# Patient Record
Sex: FEMALE | Race: WHITE | NOT HISPANIC OR LATINO
[De-identification: names, ages, dates, MRNs, and addresses within clinical notes are randomized per-mention and may not be internally consistent; named-entity substitution may affect disease eponyms.]

---

## 2017-03-03 ENCOUNTER — CHARTING TRANS (OUTPATIENT)
Dept: OTHER | Age: 57
End: 2017-03-03

## 2018-11-05 VITALS
HEART RATE: 91 BPM | DIASTOLIC BLOOD PRESSURE: 70 MMHG | TEMPERATURE: 98.6 F | WEIGHT: 192 LBS | RESPIRATION RATE: 20 BRPM | SYSTOLIC BLOOD PRESSURE: 120 MMHG | BODY MASS INDEX: 30.13 KG/M2 | HEIGHT: 67 IN

## 2021-06-18 ENCOUNTER — EXTERNAL LAB (OUTPATIENT)
Dept: OTHER | Age: 61
End: 2021-06-18

## 2021-06-18 LAB — LAB RESULT: NORMAL

## 2021-08-20 ENCOUNTER — EXTERNAL LAB (OUTPATIENT)
Dept: OTHER | Age: 61
End: 2021-08-20

## 2021-08-20 LAB — LAB RESULT: NORMAL

## 2021-12-30 ENCOUNTER — HOSPITAL ENCOUNTER (OUTPATIENT)
Age: 61
Discharge: HOME | End: 2021-12-30
Payer: COMMERCIAL

## 2021-12-30 VITALS
RESPIRATION RATE: 18 BRPM | TEMPERATURE: 97 F | HEART RATE: 100 BPM | OXYGEN SATURATION: 98 % | SYSTOLIC BLOOD PRESSURE: 125 MMHG | DIASTOLIC BLOOD PRESSURE: 64 MMHG

## 2021-12-30 DIAGNOSIS — H66.92 LEFT ACUTE OTITIS MEDIA: Primary | ICD-10-CM

## 2021-12-30 PROCEDURE — 99203 OFFICE O/P NEW LOW 30 MIN: CPT | Performed by: PHYSICIAN ASSISTANT

## 2021-12-30 RX ORDER — DOXYCYCLINE HYCLATE 100 MG/1
100 CAPSULE ORAL 2 TIMES DAILY
Qty: 14 CAPSULE | Refills: 0 | Status: SHIPPED | OUTPATIENT
Start: 2021-12-30 | End: 2022-01-06

## 2021-12-30 NOTE — ED INITIAL ASSESSMENT (HPI)
Pt here w c/o L earache x 3 days, states having stabbing pain. +runny nose from seasonal allergies per pt. Denies any fevers, no drainage.

## 2021-12-30 NOTE — ED PROVIDER NOTES
Patient Seen in: [facility]    History   Patient presents with:  Ear Problem Pain    Stated Complaint: [phone] left ear pain    HPI    65-year-old female presents with chief complaint of left earache. Onset 3 days ago. Pain scale 4/10. Conjunctiva are within normal limits. ENT: Pharynx noninjected. Tonsils within normal size limits bilaterally. No tonsillar exudates. Left TM injected. Opaque fluid present proximally to intact left TM. Right TM within normal limits.   Auditory canals Impression:  Left acute otitis media  (primary encounter diagnosis)    Disposition:  Discharge    Follow-up:  Aide Tillman MD  [address] [facility]  [id number]  [place] [address]  [phone]    Schedule an appointment as soon as possible for a visit in 2 days

## 2022-11-01 ENCOUNTER — EXTERNAL LAB (OUTPATIENT)
Dept: OTHER | Age: 62
End: 2022-11-01

## 2022-11-01 LAB — LAB RESULT: NORMAL

## 2022-12-16 ENCOUNTER — OFFICE VISIT (OUTPATIENT)
Dept: SURGERY | Age: 62
End: 2022-12-16

## 2022-12-16 VITALS
DIASTOLIC BLOOD PRESSURE: 86 MMHG | BODY MASS INDEX: 28.72 KG/M2 | WEIGHT: 183 LBS | SYSTOLIC BLOOD PRESSURE: 150 MMHG | HEIGHT: 67 IN | OXYGEN SATURATION: 98 % | HEART RATE: 90 BPM | TEMPERATURE: 97.5 F

## 2022-12-16 DIAGNOSIS — C44.90 PRIMARY ADNEXAL CARCINOMA OF SKIN: Primary | ICD-10-CM

## 2022-12-16 PROCEDURE — 99203 OFFICE O/P NEW LOW 30 MIN: CPT | Performed by: PLASTIC SURGERY

## 2023-01-31 ASSESSMENT — ACTIVITIES OF DAILY LIVING (ADL)
HISTORY OF FALLING IN THE LAST YEAR (PRIOR TO ADMISSION): NO
SENSORY_SUPPORT_DEVICES: CONTACTS
ADL_BEFORE_ADMISSION: INDEPENDENT
ADL_SCORE: 12

## 2023-02-01 ENCOUNTER — HOSPITAL ENCOUNTER (OUTPATIENT)
Age: 63
Discharge: HOME | End: 2023-02-01
Attending: PLASTIC SURGERY | Admitting: PLASTIC SURGERY

## 2023-02-01 VITALS
TEMPERATURE: 97.5 F | BODY MASS INDEX: 28.06 KG/M2 | WEIGHT: 178.79 LBS | SYSTOLIC BLOOD PRESSURE: 131 MMHG | DIASTOLIC BLOOD PRESSURE: 67 MMHG | HEIGHT: 67 IN | RESPIRATION RATE: 18 BRPM | HEART RATE: 81 BPM | OXYGEN SATURATION: 97 %

## 2023-02-01 DIAGNOSIS — C44.90 PRIMARY ADNEXAL CARCINOMA OF SKIN: ICD-10-CM

## 2023-02-01 PROCEDURE — 15271 SKIN SUB GRAFT TRNK/ARM/LEG: CPT | Performed by: PLASTIC SURGERY

## 2023-02-01 PROCEDURE — 11622 EXC S/N/H/F/G MAL+MRG 1.1-2: CPT | Performed by: PLASTIC SURGERY

## 2023-02-01 PROCEDURE — C1762 CONN TISS, HUMAN(INC FASCIA): HCPCS | Performed by: PLASTIC SURGERY

## 2023-02-01 PROCEDURE — 10006023 HB SUPPLY 272: Performed by: PLASTIC SURGERY

## 2023-02-01 PROCEDURE — 10004651 HB RX, NO CHARGE ITEM: Performed by: PLASTIC SURGERY

## 2023-02-01 PROCEDURE — 10006027 HB SUPPLY 278: Performed by: PLASTIC SURGERY

## 2023-02-01 PROCEDURE — 13000056 HB LOCAL 3 CASE CHARGE: Performed by: PLASTIC SURGERY

## 2023-02-01 PROCEDURE — 88305 TISSUE EXAM BY PATHOLOGIST: CPT | Performed by: PLASTIC SURGERY

## 2023-02-01 PROCEDURE — 10002801 HB RX 250 W/O HCPCS: Performed by: PLASTIC SURGERY

## 2023-02-01 DEVICE — IMPLANTABLE DEVICE: Type: IMPLANTABLE DEVICE | Site: TOE | Status: FUNCTIONAL

## 2023-02-01 RX ORDER — HYDROCODONE BITARTRATE AND ACETAMINOPHEN 5; 325 MG/1; MG/1
1 TABLET ORAL EVERY 6 HOURS PRN
Qty: 5 TABLET | Refills: 0 | Status: SHIPPED | OUTPATIENT
Start: 2023-02-01 | End: 2023-02-07 | Stop reason: CLARIF

## 2023-02-01 RX ORDER — LIDOCAINE HYDROCHLORIDE AND EPINEPHRINE 10; 10 MG/ML; UG/ML
INJECTION, SOLUTION INFILTRATION; PERINEURAL PRN
Status: DISCONTINUED | OUTPATIENT
Start: 2023-02-01 | End: 2023-02-01 | Stop reason: HOSPADM

## 2023-02-01 RX ORDER — ACETAMINOPHEN 500 MG
1000 TABLET ORAL
Status: COMPLETED | OUTPATIENT
Start: 2023-02-01 | End: 2023-02-01

## 2023-02-01 RX ORDER — MAGNESIUM HYDROXIDE 1200 MG/15ML
LIQUID ORAL PRN
Status: DISCONTINUED | OUTPATIENT
Start: 2023-02-01 | End: 2023-02-01 | Stop reason: HOSPADM

## 2023-02-01 RX ORDER — LIDOCAINE HYDROCHLORIDE 10 MG/ML
INJECTION, SOLUTION INFILTRATION; PERINEURAL PRN
Status: DISCONTINUED | OUTPATIENT
Start: 2023-02-01 | End: 2023-02-01 | Stop reason: HOSPADM

## 2023-02-01 RX ADMIN — ACETAMINOPHEN 1000 MG: 500 TABLET ORAL at 17:23

## 2023-02-01 ASSESSMENT — PAIN SCALES - GENERAL
PAINLEVEL_OUTOF10: 0

## 2023-02-07 ENCOUNTER — OFFICE VISIT (OUTPATIENT)
Dept: SURGERY | Age: 63
End: 2023-02-07

## 2023-02-07 VITALS
DIASTOLIC BLOOD PRESSURE: 84 MMHG | SYSTOLIC BLOOD PRESSURE: 142 MMHG | TEMPERATURE: 96.8 F | OXYGEN SATURATION: 100 % | HEIGHT: 67 IN | WEIGHT: 185 LBS | HEART RATE: 83 BPM | BODY MASS INDEX: 29.03 KG/M2

## 2023-02-07 DIAGNOSIS — C44.90 PRIMARY ADNEXAL CARCINOMA OF SKIN: Primary | ICD-10-CM

## 2023-02-07 PROCEDURE — 99024 POSTOP FOLLOW-UP VISIT: CPT | Performed by: PLASTIC SURGERY

## 2023-02-09 LAB
ASR DISCLAIMER: NORMAL
CASE RPRT: NORMAL
CLINICAL INFO: NORMAL
PATH REPORT.FINAL DX SPEC: NORMAL
PATH REPORT.GROSS SPEC: NORMAL

## 2023-02-27 ENCOUNTER — PREP FOR CASE (OUTPATIENT)
Dept: SURGERY | Age: 63
End: 2023-02-27

## 2023-02-27 ENCOUNTER — OFFICE VISIT (OUTPATIENT)
Dept: SURGERY | Age: 63
End: 2023-02-27

## 2023-02-27 VITALS
OXYGEN SATURATION: 97 % | DIASTOLIC BLOOD PRESSURE: 88 MMHG | BODY MASS INDEX: 29.35 KG/M2 | HEIGHT: 67 IN | SYSTOLIC BLOOD PRESSURE: 138 MMHG | WEIGHT: 187 LBS | TEMPERATURE: 97.6 F | HEART RATE: 85 BPM

## 2023-02-27 DIAGNOSIS — C44.90 PRIMARY ADNEXAL CARCINOMA OF SKIN: Primary | ICD-10-CM

## 2023-02-27 PROCEDURE — 99024 POSTOP FOLLOW-UP VISIT: CPT | Performed by: PLASTIC SURGERY

## 2023-03-10 RX ORDER — ACETAMINOPHEN 500 MG
1000 TABLET ORAL
Status: CANCELLED | OUTPATIENT
Start: 2023-03-10

## 2023-03-20 ENCOUNTER — OFFICE VISIT (OUTPATIENT)
Dept: SURGERY | Age: 63
End: 2023-03-20

## 2023-03-20 VITALS
SYSTOLIC BLOOD PRESSURE: 124 MMHG | WEIGHT: 189.6 LBS | OXYGEN SATURATION: 96 % | HEART RATE: 96 BPM | DIASTOLIC BLOOD PRESSURE: 85 MMHG | HEIGHT: 67 IN | BODY MASS INDEX: 29.76 KG/M2 | TEMPERATURE: 97.4 F

## 2023-03-20 DIAGNOSIS — C44.90 PRIMARY ADNEXAL CARCINOMA OF SKIN: Primary | ICD-10-CM

## 2023-03-20 PROCEDURE — 99024 POSTOP FOLLOW-UP VISIT: CPT | Performed by: PLASTIC SURGERY

## 2023-05-04 ENCOUNTER — HOSPITAL ENCOUNTER (OUTPATIENT)
Age: 63
Discharge: HOME | End: 2023-05-04
Attending: PLASTIC SURGERY | Admitting: PLASTIC SURGERY

## 2023-05-04 VITALS
DIASTOLIC BLOOD PRESSURE: 76 MMHG | BODY MASS INDEX: 29.03 KG/M2 | TEMPERATURE: 97.2 F | WEIGHT: 184.97 LBS | OXYGEN SATURATION: 97 % | RESPIRATION RATE: 16 BRPM | HEART RATE: 96 BPM | SYSTOLIC BLOOD PRESSURE: 131 MMHG | HEIGHT: 67 IN

## 2023-05-04 DIAGNOSIS — C44.90 PRIMARY ADNEXAL CARCINOMA OF SKIN: Primary | ICD-10-CM

## 2023-05-04 PROCEDURE — C1762 CONN TISS, HUMAN(INC FASCIA): HCPCS | Performed by: PLASTIC SURGERY

## 2023-05-04 PROCEDURE — 10004651 HB RX, NO CHARGE ITEM: Performed by: PLASTIC SURGERY

## 2023-05-04 PROCEDURE — 15275 SKIN SUB GRAFT FACE/NK/HF/G: CPT | Performed by: PLASTIC SURGERY

## 2023-05-04 PROCEDURE — 11622 EXC S/N/H/F/G MAL+MRG 1.1-2: CPT | Performed by: PLASTIC SURGERY

## 2023-05-04 PROCEDURE — 88305 TISSUE EXAM BY PATHOLOGIST: CPT | Performed by: PLASTIC SURGERY

## 2023-05-04 PROCEDURE — 10002801 HB RX 250 W/O HCPCS

## 2023-05-04 PROCEDURE — 10002801 HB RX 250 W/O HCPCS: Performed by: PLASTIC SURGERY

## 2023-05-04 PROCEDURE — 13000056 HB LOCAL 3 CASE CHARGE: Performed by: PLASTIC SURGERY

## 2023-05-04 PROCEDURE — 10006027 HB SUPPLY 278: Performed by: PLASTIC SURGERY

## 2023-05-04 DEVICE — GRAFT AXIOFILL PARTICULATE 1000MG 100CM SQ ACELLULAR ECM: Type: IMPLANTABLE DEVICE | Site: TOE | Status: FUNCTIONAL

## 2023-05-04 RX ORDER — DOCUSATE SODIUM 100 MG/1
100 CAPSULE, LIQUID FILLED ORAL 2 TIMES DAILY
Qty: 10 CAPSULE | Refills: 0 | Status: SHIPPED | OUTPATIENT
Start: 2023-05-04 | End: 2023-05-11

## 2023-05-04 RX ORDER — ONDANSETRON 2 MG/ML
4 INJECTION INTRAMUSCULAR; INTRAVENOUS EVERY 12 HOURS PRN
Status: DISCONTINUED | OUTPATIENT
Start: 2023-05-04 | End: 2023-05-04 | Stop reason: HOSPADM

## 2023-05-04 RX ORDER — ACETAMINOPHEN 500 MG
1000 TABLET ORAL
Status: COMPLETED | OUTPATIENT
Start: 2023-05-04 | End: 2023-05-04

## 2023-05-04 RX ORDER — HYDROCODONE BITARTRATE AND ACETAMINOPHEN 5; 325 MG/1; MG/1
2 TABLET ORAL EVERY 4 HOURS PRN
Status: DISCONTINUED | OUTPATIENT
Start: 2023-05-04 | End: 2023-05-04 | Stop reason: HOSPADM

## 2023-05-04 RX ORDER — SODIUM CHLORIDE, SODIUM LACTATE, POTASSIUM CHLORIDE, CALCIUM CHLORIDE 600; 310; 30; 20 MG/100ML; MG/100ML; MG/100ML; MG/100ML
INJECTION, SOLUTION INTRAVENOUS CONTINUOUS
Status: DISCONTINUED | OUTPATIENT
Start: 2023-05-04 | End: 2023-05-04 | Stop reason: HOSPADM

## 2023-05-04 RX ORDER — HYDROCODONE BITARTRATE AND ACETAMINOPHEN 5; 325 MG/1; MG/1
1 TABLET ORAL EVERY 4 HOURS PRN
Status: DISCONTINUED | OUTPATIENT
Start: 2023-05-04 | End: 2023-05-04 | Stop reason: HOSPADM

## 2023-05-04 RX ORDER — ONDANSETRON 4 MG/1
4 TABLET, ORALLY DISINTEGRATING ORAL EVERY 12 HOURS PRN
Status: DISCONTINUED | OUTPATIENT
Start: 2023-05-04 | End: 2023-05-04 | Stop reason: HOSPADM

## 2023-05-04 RX ORDER — MAGNESIUM HYDROXIDE 1200 MG/15ML
LIQUID ORAL PRN
Status: DISCONTINUED | OUTPATIENT
Start: 2023-05-04 | End: 2023-05-04 | Stop reason: HOSPADM

## 2023-05-04 RX ORDER — HYDROCODONE BITARTRATE AND ACETAMINOPHEN 5; 325 MG/1; MG/1
1 TABLET ORAL EVERY 6 HOURS PRN
Qty: 10 TABLET | Refills: 0 | Status: SHIPPED | OUTPATIENT
Start: 2023-05-04 | End: 2023-05-11

## 2023-05-04 RX ORDER — ACETAMINOPHEN 500 MG
500 TABLET ORAL EVERY 4 HOURS PRN
Status: DISCONTINUED | OUTPATIENT
Start: 2023-05-04 | End: 2023-05-04 | Stop reason: HOSPADM

## 2023-05-04 RX ADMIN — ACETAMINOPHEN 1000 MG: 500 TABLET ORAL at 14:40

## 2023-05-04 ASSESSMENT — PAIN SCALES - GENERAL: PAINLEVEL_OUTOF10: 0

## 2023-05-11 ENCOUNTER — OFFICE VISIT (OUTPATIENT)
Dept: SURGERY | Age: 63
End: 2023-05-11

## 2023-05-11 VITALS
HEIGHT: 67 IN | SYSTOLIC BLOOD PRESSURE: 125 MMHG | HEART RATE: 93 BPM | WEIGHT: 189 LBS | RESPIRATION RATE: 17 BRPM | TEMPERATURE: 97.6 F | BODY MASS INDEX: 29.66 KG/M2 | OXYGEN SATURATION: 98 % | DIASTOLIC BLOOD PRESSURE: 78 MMHG

## 2023-05-11 DIAGNOSIS — C44.90 PRIMARY ADNEXAL CARCINOMA OF SKIN: Primary | ICD-10-CM

## 2023-05-11 PROCEDURE — 99024 POSTOP FOLLOW-UP VISIT: CPT | Performed by: PHYSICIAN ASSISTANT

## 2023-05-18 ENCOUNTER — OFFICE VISIT (OUTPATIENT)
Dept: SURGERY | Age: 63
End: 2023-05-18

## 2023-05-18 VITALS
OXYGEN SATURATION: 98 % | WEIGHT: 189 LBS | BODY MASS INDEX: 29.66 KG/M2 | RESPIRATION RATE: 17 BRPM | HEIGHT: 67 IN | TEMPERATURE: 96.5 F | SYSTOLIC BLOOD PRESSURE: 135 MMHG | HEART RATE: 70 BPM | DIASTOLIC BLOOD PRESSURE: 83 MMHG

## 2023-05-18 DIAGNOSIS — C44.90 PRIMARY ADNEXAL CARCINOMA OF SKIN: Primary | ICD-10-CM

## 2023-05-18 PROCEDURE — 99024 POSTOP FOLLOW-UP VISIT: CPT | Performed by: PHYSICIAN ASSISTANT

## 2023-06-01 ENCOUNTER — OFFICE VISIT (OUTPATIENT)
Dept: SURGERY | Age: 63
End: 2023-06-01

## 2023-06-01 VITALS
DIASTOLIC BLOOD PRESSURE: 77 MMHG | SYSTOLIC BLOOD PRESSURE: 116 MMHG | HEIGHT: 67 IN | HEART RATE: 85 BPM | TEMPERATURE: 98 F | OXYGEN SATURATION: 98 % | RESPIRATION RATE: 16 BRPM | BODY MASS INDEX: 29.26 KG/M2 | WEIGHT: 186.4 LBS

## 2023-06-01 DIAGNOSIS — C44.90 PRIMARY ADNEXAL CARCINOMA OF SKIN: Primary | ICD-10-CM

## 2023-06-01 PROCEDURE — 99024 POSTOP FOLLOW-UP VISIT: CPT | Performed by: PHYSICIAN ASSISTANT

## 2023-06-22 ENCOUNTER — OFFICE VISIT (OUTPATIENT)
Dept: SURGERY | Age: 63
End: 2023-06-22

## 2023-06-22 VITALS
TEMPERATURE: 96.3 F | OXYGEN SATURATION: 96 % | HEIGHT: 67 IN | WEIGHT: 188.7 LBS | HEART RATE: 93 BPM | RESPIRATION RATE: 19 BRPM | DIASTOLIC BLOOD PRESSURE: 77 MMHG | SYSTOLIC BLOOD PRESSURE: 122 MMHG | BODY MASS INDEX: 29.62 KG/M2

## 2023-06-22 DIAGNOSIS — C44.90 PRIMARY ADNEXAL CARCINOMA OF SKIN: Primary | ICD-10-CM

## 2023-06-22 PROCEDURE — 99024 POSTOP FOLLOW-UP VISIT: CPT | Performed by: PHYSICIAN ASSISTANT

## 2023-06-22 RX ORDER — DOXYCYCLINE HYCLATE 100 MG
TABLET ORAL
COMMUNITY
Start: 2023-04-21 | End: 2023-06-22

## 2023-07-17 ENCOUNTER — OFFICE VISIT (OUTPATIENT)
Dept: SURGERY | Age: 63
End: 2023-07-17

## 2023-07-17 ENCOUNTER — APPOINTMENT (OUTPATIENT)
Dept: SURGERY | Age: 63
End: 2023-07-17

## 2023-07-17 VITALS
HEIGHT: 67 IN | DIASTOLIC BLOOD PRESSURE: 82 MMHG | BODY MASS INDEX: 31 KG/M2 | RESPIRATION RATE: 17 BRPM | WEIGHT: 197.5 LBS | SYSTOLIC BLOOD PRESSURE: 136 MMHG | TEMPERATURE: 96.3 F | OXYGEN SATURATION: 97 % | HEART RATE: 85 BPM

## 2023-07-17 DIAGNOSIS — C44.90 PRIMARY ADNEXAL CARCINOMA OF SKIN: Primary | ICD-10-CM

## 2023-07-17 PROCEDURE — 99213 OFFICE O/P EST LOW 20 MIN: CPT | Performed by: PHYSICIAN ASSISTANT

## 2023-07-17 RX ORDER — SODIUM FLUORIDE AND POTASSIUM NITRATE 5.8; 57.5 MG/ML; MG/ML
GEL, DENTIFRICE DENTAL
COMMUNITY
End: 2023-08-28

## 2023-07-17 RX ORDER — IBUPROFEN 600 MG/1
1 TABLET ORAL 3 TIMES DAILY
COMMUNITY

## 2023-08-28 ENCOUNTER — OFFICE VISIT (OUTPATIENT)
Dept: SURGERY | Age: 63
End: 2023-08-28

## 2023-08-28 VITALS
HEIGHT: 67 IN | TEMPERATURE: 96.7 F | SYSTOLIC BLOOD PRESSURE: 126 MMHG | DIASTOLIC BLOOD PRESSURE: 84 MMHG | OXYGEN SATURATION: 97 % | WEIGHT: 194.4 LBS | HEART RATE: 76 BPM | BODY MASS INDEX: 30.51 KG/M2 | RESPIRATION RATE: 18 BRPM

## 2023-08-28 DIAGNOSIS — C44.90 PRIMARY ADNEXAL CARCINOMA OF SKIN: Primary | ICD-10-CM

## 2023-08-28 PROCEDURE — 99213 OFFICE O/P EST LOW 20 MIN: CPT | Performed by: PHYSICIAN ASSISTANT

## (undated) DEVICE — CABLE BP 12FT FTSWTCH CAUT STRL DISP

## (undated) DEVICE — ELECTRODE ESURG BLADE 10FT 38IN PVC FREE ULTRA LIGHT TUBE

## (undated) DEVICE — GLOVE SURG 8.5 PROTEXIS ESTM LF CRM PF SMTH BEAD CUFF INTLK

## (undated) DEVICE — SUTURE 6-0 C-1 18IN MILD CR MONO ABS G-3810

## (undated) DEVICE — MARKER TWIN SURGL SKIN STL

## (undated) DEVICE — Device

## (undated) DEVICE — GOWN SURG XL L4 RAGLAN SLV BRTHBL STRL LF DISP SMARTGOWN

## (undated) DEVICE — SUTURE ETHILON MTPS 5-0 P-3 18IN MONO NABSB BLK 698G

## (undated) DEVICE — GLOVE SURG 7.5 PROTEXIS LF BLUE PF SMTH BEAD CUFF INTLK STRL

## (undated) DEVICE — GLOVE SURG 6.5 PROTEXIS PI LF CRM PF BEAD CUFF STRL PLISPRN

## (undated) DEVICE — SPONGE LAP 18X18IN STRL

## (undated) DEVICE — DRESSING PETRO 9X5IN XEROFORM 3% BI 3BRM GAUZE

## (undated) DEVICE — GLOVE SURG 7 PROTEXIS PI LF CRM PF BEAD CUFF STRL PLISPRN

## (undated) DEVICE — ELECTRODE PT RTN C30- LB 9FT CORD NONIRRITATE NONSENSITIZE

## (undated) DEVICE — ELECTRODE ESURG NDL 2.84IN .2IN 332IN INSULATE STD SHAFT

## (undated) DEVICE — GLOVE SURG 7 PROTEXIS LF BLUE PF SMTH BEAD CUFF INTLK STRL

## (undated) DEVICE — ELECTRODE ESURG BLADE PNCL 10FT 38IN TUBE ADPR PORT

## (undated) DEVICE — ELECTRODE ESURG 10FT 2 DSPR ADH BRDR PULL TAB PREATTACH CBL

## (undated) DEVICE — SYRINGE 10ML CNTRL CONC TIP PYROGEN FREE DEHP-FR STRL MED LF

## (undated) DEVICE — PAD DRSG 8X3IN CRD POLY CTN ABS PERFORATE FILM LF STRL

## (undated) DEVICE — NEEDLE HPO 25GA 1.5IN THNWL REG BVL LL PIVOT SHLD MECH

## (undated) DEVICE — NEEDLE HPO 25GA 1.5IN REG WALL REG BVL LL HUB DEHP-FR STRL

## (undated) DEVICE — TUBING SCT 10FT 316IN MDVC NCDTV MALE TO MALE CNCT

## (undated) DEVICE — NEEDLE BLUNT 18GA 1.5IN REG WALL FILL LL HUB DEHP-FR STRL LF

## (undated) DEVICE — DRAPE SPLIT 40X4IN 120X92IN SURG TIBURON

## (undated) DEVICE — GLOVE SURG 9 PROTEXIS LF BLUE PF SMTH BEAD CUFF INTLK STRL